# Patient Record
Sex: FEMALE
[De-identification: names, ages, dates, MRNs, and addresses within clinical notes are randomized per-mention and may not be internally consistent; named-entity substitution may affect disease eponyms.]

---

## 2019-11-26 PROBLEM — Z00.00 ENCOUNTER FOR PREVENTIVE HEALTH EXAMINATION: Status: ACTIVE | Noted: 2019-11-26

## 2019-11-27 ENCOUNTER — APPOINTMENT (OUTPATIENT)
Dept: ORTHOPEDIC SURGERY | Facility: CLINIC | Age: 49
End: 2019-11-27
Payer: MEDICAID

## 2019-12-02 ENCOUNTER — APPOINTMENT (OUTPATIENT)
Dept: ORTHOPEDIC SURGERY | Facility: CLINIC | Age: 49
End: 2019-12-02
Payer: MEDICAID

## 2019-12-02 VITALS
DIASTOLIC BLOOD PRESSURE: 65 MMHG | HEIGHT: 61 IN | TEMPERATURE: 98.1 F | SYSTOLIC BLOOD PRESSURE: 110 MMHG | WEIGHT: 150 LBS | BODY MASS INDEX: 28.32 KG/M2 | HEART RATE: 76 BPM

## 2019-12-02 DIAGNOSIS — M76.899 OTHER SPECIFIED ENTHESOPATHIES OF UNSPECIFIED LOWER LIMB, EXCLUDING FOOT: ICD-10-CM

## 2019-12-02 DIAGNOSIS — Z78.9 OTHER SPECIFIED HEALTH STATUS: ICD-10-CM

## 2019-12-02 PROCEDURE — 99202 OFFICE O/P NEW SF 15 MIN: CPT

## 2019-12-02 PROCEDURE — 73560 X-RAY EXAM OF KNEE 1 OR 2: CPT | Mod: LT

## 2019-12-04 NOTE — CONSULT LETTER
[Dear  ___] : Dear  [unfilled], [Please see my note below.] : Please see my note below. [Consult Letter:] : I had the pleasure of evaluating your patient, [unfilled]. [Consult Closing:] : Thank you very much for allowing me to participate in the care of this patient.  If you have any questions, please do not hesitate to contact me. [Sincerely,] : Sincerely, [FreeTextEntry3] : Riley Floyd III, MD\par [facility]

## 2019-12-04 NOTE — DISCUSSION/SUMMARY
[de-identified] : At this time, due to quadriceps tendinitis of the left knee, I recommend rest, ice, anti-inflammatory, physical therapy and reassessment in 4-6 weeks.

## 2019-12-04 NOTE — ADDENDUM
[FreeTextEntry1] : This note was written by Avani Schultz on 12/03/2019 acting as scribe for Riley Floyd III, MD

## 2019-12-04 NOTE — PHYSICAL EXAM
[de-identified] : Right Knee: Range of Motion in Degrees	\par 	                  Claimant:	Normal:	\par Flexion Active	  135 	                135-degrees	\par Flexion Passive	  135	                135-degrees	\par Extension Active	  0-5	                0-5-degrees	\par Extension Passive	  0-5	                0-5-degrees	\par \par No weakness to flexion/extension.  No evidence of instability in the AP plane or varus or valgus stress.  Negative  Lachman.  Negative pivot shift.  Negative anterior drawer test.  Negative posterior drawer test.  Negative Pablo.  Negative Apley grind.  No medial or lateral joint line tenderness.  No tenderness over the medial and lateral facet of the patella.  No patellofemoral crepitations.  No lateral tilting patella.  No patellar apprehension.  No crepitation in the medial and lateral femoral condyle.  No proximal or distal swelling, edema or tenderness.  No gross motor or sensory deficits.  No intra-articular swelling.  2+ DP and PT pulses. No varus or valgus malalignment.  Skin is intact.  No rashes, scars or lesions.  \par \par Left Knee: Range of Motion in Degrees	\par 	                  Claimant:	Normal:	\par Flexion Active	  135 	                135-degrees	\par Flexion Passive	  135	                135-degrees	\par Extension Active	  0-5	                0-5-degrees	\par Extension Passive	  0-5	                0-5-degrees	\par \par No weakness to flexion/extension.  Significant tenderness at the insertion of the quadriceps.  No evidence of instability in the AP plane or varus or valgus stress.  Negative  Lachman.  Negative pivot shift.  Negative anterior drawer test.  Negative posterior drawer test.  Negative Pablo.  Negative Apley grind.  No tenderness over the medial and lateral facet of the patella.  No patellofemoral crepitations.  No lateral tilting patella.  No patella apprehension.  No crepitation in the medial and lateral femoral condyle.  No proximal or distal swelling or edema.  No gross motor or sensory deficits.   No intra-articular swelling.  2+ DP and PT pulses.  No varus or valgus malalignment.  Skin is intact.  No rashes, scars or lesions.\par    [de-identified] : Gait and Station:  Ambulating with a slightly antalgic to antalgic gait.  Station:  Normal.  [de-identified] : Appearance:  Well-developed, well-nourished female in no acute distress.\par   [de-identified] : Radiographs, one to two views of the left knee, show no obvious osseous abnormalities.

## 2019-12-04 NOTE — HISTORY OF PRESENT ILLNESS
[de-identified] : The patient comes in today with a three month history of complaints of pain.  She points to the anterior proximal aspect of the left knee.  She denies any inciting traumatic events.  This injury is not work related or due to an automobile accident.   [] : No

## 2020-02-04 ENCOUNTER — APPOINTMENT (OUTPATIENT)
Dept: SURGICAL ONCOLOGY | Facility: CLINIC | Age: 50
End: 2020-02-04
Payer: COMMERCIAL

## 2020-02-04 VITALS
DIASTOLIC BLOOD PRESSURE: 82 MMHG | SYSTOLIC BLOOD PRESSURE: 131 MMHG | TEMPERATURE: 98.1 F | OXYGEN SATURATION: 100 % | WEIGHT: 150 LBS | HEART RATE: 98 BPM | BODY MASS INDEX: 28.32 KG/M2 | HEIGHT: 61 IN

## 2020-02-04 DIAGNOSIS — R92.8 OTHER ABNORMAL AND INCONCLUSIVE FINDINGS ON DIAGNOSTIC IMAGING OF BREAST: ICD-10-CM

## 2020-02-04 PROCEDURE — 99204 OFFICE O/P NEW MOD 45 MIN: CPT

## 2020-02-06 NOTE — PHYSICAL EXAM
[Normal] : supple, no neck mass and thyroid not enlarged [Normal Neck Lymph Nodes] : normal neck lymph nodes  [Normal Groin Lymph Nodes] : normal groin lymph nodes [Normal Supraclavicular Lymph Nodes] : normal supraclavicular lymph nodes [Normal Axillary Lymph Nodes] : normal axillary lymph nodes [Normal] : oriented to person, place and time, with appropriate affect

## 2020-02-06 NOTE — HISTORY OF PRESENT ILLNESS
[de-identified] : Ms. Caro is an otherwise healthy 49 year old woman who presents to discuss the results of an abnormal mammogram. She denies any new masses/pain/changes/nipple inversion in either breast and reports for yearly screening breast imaging. She denies a family or personal history of breast or ovarian cancer. Her imaging revealed new bilateral breast nodules c/w cysts, giving a designation of BIRADS3.

## 2020-02-06 NOTE — RESULTS/DATA
[FreeTextEntry1] : Outside mammo/sono from [facility] performed 12/19-1/20 reveal BIRADS3 cysts, new, in either breast

## 2020-02-06 NOTE — ASSESSMENT
[FreeTextEntry1] : 49 year old woman with newly diagnosed bilateral breast cystic masses on screening mammo/sono. BIRADS3 designation. Non-concerning clinical breast exam. Normal risk for breast cancer. I explained to the patient the meaning of BIRADS3 findings, the likely benign nature of these cysts, and the need for interval follow up imaging in 6 months. I explained that if her cysts/masses change or become symptomatic that expeditious biopsy could be performed, but that in the absence of any concerning findings/symptoms a 6 month interval imaging is warranted. She agreed with the plan and can return to see me on an as-needed basis.

## 2021-10-14 NOTE — REVIEW OF SYSTEMS
Nicholas Soria      1.  Has the patient received the information for the influenza vaccine? YES    2.  Does the patient have any of the following contraindications?     Allergy to eggs? No     Allergic reaction to previous influenza vaccines? No     Any other problems to previous influenza vaccines? No     Paralyzed by Guillain-Beatrice syndrome? No     Currently pregnant? NO     Current moderate or severe illness? No     Allergy to contact lens solution? No    3.  The vaccine has been administered in the usual fashion and the patient was instructed to wait 20 minutes before leaving the building in the event of an allergic reaction: YES    Vaccination given by Anushka Rossi LPN  .  Recorded by Anushka Rossi LPN        
[Negative] : Heme/Lymph